# Patient Record
Sex: MALE | Race: WHITE | NOT HISPANIC OR LATINO | Employment: FULL TIME | URBAN - METROPOLITAN AREA
[De-identification: names, ages, dates, MRNs, and addresses within clinical notes are randomized per-mention and may not be internally consistent; named-entity substitution may affect disease eponyms.]

---

## 2020-03-05 ENCOUNTER — OFFICE VISIT (OUTPATIENT)
Dept: ORTHOPEDIC SURGERY | Facility: CLINIC | Age: 30
End: 2020-03-05

## 2020-03-05 VITALS — HEART RATE: 80 BPM | BODY MASS INDEX: 21.7 KG/M2 | OXYGEN SATURATION: 97 % | WEIGHT: 155 LBS | HEIGHT: 71 IN

## 2020-03-05 DIAGNOSIS — S92.421A CLOSED DISPLACED FRACTURE OF DISTAL PHALANX OF RIGHT GREAT TOE, INITIAL ENCOUNTER: ICD-10-CM

## 2020-03-05 DIAGNOSIS — M79.674 GREAT TOE PAIN, RIGHT: Primary | ICD-10-CM

## 2020-03-05 PROCEDURE — 99204 OFFICE O/P NEW MOD 45 MIN: CPT | Performed by: PHYSICIAN ASSISTANT

## 2020-03-05 PROCEDURE — 28490 TREAT BIG TOE FRACTURE: CPT | Performed by: PHYSICIAN ASSISTANT

## 2020-03-05 NOTE — PROGRESS NOTES
[facility] [place] [facility] Note    Subjective     Chief Complaint   Patient presents with   • Right Foot - Pain     Great toe injury at [facility] 02/29/2020        HPI  Boston Urena is a 29 y.o. male.  Patient presents for evaluation of his right foot great toe.  Stated on 2/29/2020 he injured his toe--stubbed it.  Seen in urgent care the next day and diagnosed with a great toe distal phalanx fracture.  Patient ordered a hard sole postop shoe off of [employer] is been wearing that.  Currently endorses a pain scale of 5/10.  Severity the pain mild to moderate.  Quality the pain burning.  No reported numbness or tingling.    Patient denies any fever, chills, night sweats or other constitutional symptoms.    History reviewed. No pertinent past medical history.   No past surgical history on file.   History reviewed. No pertinent family history.  Social History     Socioeconomic History   • Marital status: Single     Spouse name: Not on file   • Number of children: Not on file   • Years of education: Not on file   • Highest education level: Not on file   Tobacco Use   • Smoking status: Never Smoker      No current outpatient medications on file prior to visit.     No current facility-administered medications on file prior to visit.       No Known Allergies     The following portions of the patient's history were reviewed and updated as appropriate: allergies, current medications, past family history, past medical history, past social history, past surgical history and problem list.    Review of Systems   Constitutional: Negative.    HENT: Negative.    Eyes: Negative.    Respiratory: Negative.    Cardiovascular: Negative.    Gastrointestinal: Negative.    Endocrine: Negative.    Genitourinary: Negative.    Musculoskeletal: Positive for arthralgias.   Skin: Negative.    Allergic/Immunologic: Negative.    Neurological: Negative.    Hematological: Negative.    Psychiatric/Behavioral: Negative.         Objective   "    Physical Exam  Pulse 80   Ht 180.3 cm (71\")   Wt 70.3 kg (155 lb)   SpO2 97%   BMI 21.62 kg/m²     Body mass index is 21.62 kg/m².    GENERAL APPEARANCE: awake, alert & oriented x 3, in no acute distress and well developed, well nourished  PSYCH: normal mood and affect  LUNGS:  breathing nonlabored, no wheezing  EYES: sclera anicteric, pupils equal  CARDIOVASCULAR: palpable pulses dorsalis pedis, palpable posterior tibial bilaterally. Capillary refill less than 2 seconds  INTEGUMENTARY: skin intact, no clubbing, cyanosis  NEUROLOGIC:  Normal Sensation        Ortho Exam  Right foot/great toe  Skin: Grossly intact without any redness or warmth.  Positive soft tissue swelling is ecchymosis noted to the dorsum of the great toe especially along the IPJ.  Tenderness: Positive medial aspect at level of the IPJ.  He also has some mild discomfort noted throughout the rest of the great toe.  No foot pain or ankle pain on exam today.  Motion: Patient is able to wiggle all toes.   Motor: Grossly intact anterior tib, gastroc, EHL, FHL.  Sensory: Grossly intact DP, SP, S, S, T nerve distributions.  Vascular: 2+ DP/PT with brisk capillary refill noted into each digit.      Imaging/Studies  Dr. Stein and I reviewed plain film imaging performed on 3/1/2020 of the right great toe.    EXAMINATION: XR TOE 2+ VW, RIGHT-03/01/2020:      INDICATION: Great toe pain, swelling, bruising after stubbing it;  S99.921A-Unspecified injury of right foot, initial encounter.      COMPARISON: NONE.     FINDINGS: AP, lateral and lateral oblique views of the right toes reveal  cortical irregularity seen only on lateral oblique view of minimally  displaced fracture distal phalanx great toe with intra-articular  extension.     IMPRESSION:  Abnormal cortical step-off and irregularity of minimally  displaced fracture proximal portion distal phalanx great toe with  intra-articular extension.     D:  03/01/2020  E:  03/02/2020     This report was " finalized on 3/2/2020 11:08 AM by Dr. Rickey Pineda.      Assessment/Plan        ICD-10-CM ICD-9-CM   1. Great toe pain, right M79.674 729.5   2. Closed displaced fracture of distal phalanx of right great toe, initial encounter S92.421A 826.0       No orders of the defined types were placed in this encounter.       -Right great toe pain due to minimally displaced distal phalanx fracture that does extend into the articular surface.  Because of the intra-articular involvement patient was educated on the fact that he has a high risk of developing arthritis in this area.  -Patient to continue with hard soled postop shoe.  -We discussed buddy taping great toe and second toe together if he chooses.  -Recommend over-the-counter pain medications as needed.  -Follow-up in 3 weeks for repeat evaluation that includes pre-clinic imaging of the right great toe.  -Questions and concerns answered.    History, exam and imaging discussed with Dr. Stein who agrees the above assessment and plan.      Medical Decision Making  Management Options : over-the-counter medicine and close treatment of fracture or dislocation  Data/Risk: radiology tests       Fabiola Cheung PA-C  03/06/20  11:42 AM         EMR Dragon/Transcription disclaimer:  Much of this encounter note is an electronic transcription of spoken language to printed text. Electronic transcription of spoken language may permit erroneous, or at times, nonsensical words or phrases to be inadvertently transcribed. Although I have reviewed the note for such errors, some may still exist.

## 2022-09-11 ENCOUNTER — TELEPHONE (OUTPATIENT)
Dept: URGENT CARE | Facility: CLINIC | Age: 32
End: 2022-09-11